# Patient Record
Sex: FEMALE | Race: BLACK OR AFRICAN AMERICAN | NOT HISPANIC OR LATINO | Employment: OTHER | URBAN - METROPOLITAN AREA
[De-identification: names, ages, dates, MRNs, and addresses within clinical notes are randomized per-mention and may not be internally consistent; named-entity substitution may affect disease eponyms.]

---

## 2019-05-21 PROBLEM — R13.10 DYSPHAGIA: Status: ACTIVE | Noted: 2019-03-08

## 2019-08-20 PROBLEM — H25.813 COMBINED FORMS OF AGE-RELATED CATARACT OF BOTH EYES: Status: ACTIVE | Noted: 2019-08-20

## 2019-08-20 PROBLEM — H40.1132 PRIMARY OPEN ANGLE GLAUCOMA (POAG) OF BOTH EYES, MODERATE STAGE: Status: ACTIVE | Noted: 2019-08-20

## 2019-09-26 LAB
INR PPP: 1.1
PROTHROMBIN TIME: 12.4 SEC (ref 9.4–12.5)

## 2020-02-02 PROBLEM — E78.5 HLD (HYPERLIPIDEMIA): Status: ACTIVE | Noted: 2020-02-02

## 2020-02-02 PROBLEM — R07.9 CHEST PAIN: Status: ACTIVE | Noted: 2020-02-02

## 2020-03-12 PROBLEM — F41.9 TREMOR, ANXIETY RELATED: Status: ACTIVE | Noted: 2020-03-12

## 2020-03-12 PROBLEM — R25.1 TREMOR, ANXIETY RELATED: Status: ACTIVE | Noted: 2020-03-12

## 2020-06-15 PROBLEM — I47.10 SVT (SUPRAVENTRICULAR TACHYCARDIA): Status: ACTIVE | Noted: 2019-05-30

## 2020-06-15 PROBLEM — I48.92 ATRIAL FLUTTER, CHRONIC: Status: ACTIVE | Noted: 2019-05-28

## 2020-07-02 PROBLEM — R07.9 CHEST PAIN: Status: RESOLVED | Noted: 2020-02-02 | Resolved: 2020-07-02

## 2020-07-02 PROBLEM — R06.5 XEROSTOMIA DUE TO MOUTH BREATHING: Status: ACTIVE | Noted: 2020-07-02

## 2020-07-02 PROBLEM — Z78.9 INTOLERANCE OF CONTINUOUS POSITIVE AIRWAY PRESSURE (CPAP) VENTILATION: Status: ACTIVE | Noted: 2020-07-02

## 2020-07-02 PROBLEM — K11.7 XEROSTOMIA DUE TO MOUTH BREATHING: Status: ACTIVE | Noted: 2020-07-02

## 2020-09-08 PROBLEM — H25.812 COMBINED FORM OF AGE-RELATED CATARACT, LEFT EYE: Status: ACTIVE | Noted: 2019-08-20

## 2021-03-23 PROBLEM — H04.123 DRY EYE SYNDROME, BILATERAL: Status: ACTIVE | Noted: 2021-03-23

## 2021-04-05 PROBLEM — Z00.00 PREVENTATIVE HEALTH CARE: Status: ACTIVE | Noted: 2021-04-05

## 2021-04-05 PROBLEM — N76.4 LABIAL ABSCESS: Status: ACTIVE | Noted: 2021-04-05

## 2021-05-18 PROBLEM — L73.9 FOLLICULITIS: Status: ACTIVE | Noted: 2021-05-18

## 2021-07-05 PROBLEM — Z00.00 PREVENTATIVE HEALTH CARE: Status: RESOLVED | Noted: 2021-04-05 | Resolved: 2021-07-05

## 2021-09-28 PROBLEM — H40.1132 PRIMARY OPEN ANGLE GLAUCOMA (POAG) OF BOTH EYES, MODERATE STAGE: Status: RESOLVED | Noted: 2019-08-20 | Resolved: 2021-09-28

## 2023-03-05 PROBLEM — R52 PAIN: Status: ACTIVE | Noted: 2023-03-05

## 2023-03-28 PROBLEM — M53.3 SI (SACROILIAC) JOINT DYSFUNCTION: Status: ACTIVE | Noted: 2023-03-28

## 2023-03-28 PROBLEM — M62.830 LUMBAR PARASPINAL MUSCLE SPASM: Status: ACTIVE | Noted: 2023-03-28

## 2023-05-03 ENCOUNTER — PES CALL (OUTPATIENT)
Dept: ADMINISTRATIVE | Facility: CLINIC | Age: 78
End: 2023-05-03
Payer: MEDICAID

## 2023-08-18 PROBLEM — H35.033 HYPERTENSIVE RETINOPATHY OF BOTH EYES, GRADE 1: Status: ACTIVE | Noted: 2023-08-18

## 2023-08-18 PROBLEM — H25.812 COMBINED FORMS OF AGE-RELATED CATARACT OF LEFT EYE: Status: RESOLVED | Noted: 2019-08-20 | Resolved: 2023-08-18

## 2024-01-11 DIAGNOSIS — Z00.00 ENCOUNTER FOR MEDICARE ANNUAL WELLNESS EXAM: ICD-10-CM

## 2024-02-04 PROBLEM — J06.9 UPPER RESPIRATORY TRACT INFECTION: Status: ACTIVE | Noted: 2024-02-04

## 2024-03-20 PROBLEM — I24.9 ACS (ACUTE CORONARY SYNDROME): Status: ACTIVE | Noted: 2024-03-20

## 2024-03-25 ENCOUNTER — PATIENT OUTREACH (OUTPATIENT)
Dept: ADMINISTRATIVE | Facility: CLINIC | Age: 79
End: 2024-03-25

## 2024-03-25 NOTE — PROGRESS NOTES
C3 nurse spoke with Anabel Escobar for a TCC post hospital discharge follow up call. Pt denies any new symptoms at this time and wrote down the OOC number to call for new or worsening symptoms. Several meal delivery numbers provided to the pt and she wrote them down.     The patient has a scheduled followup with Jose Payan MD ([facility]) on 3/28/24 at 0800. Message routed to Timi Chow MD requesting a visit type change to 'HOSFU.'

## 2024-04-13 PROBLEM — I20.0 UNSTABLE ANGINA PECTORIS: Status: ACTIVE | Noted: 2024-04-13

## 2024-04-13 PROBLEM — I25.110 CORONARY ARTERY DISEASE INVOLVING NATIVE CORONARY ARTERY OF NATIVE HEART WITH UNSTABLE ANGINA PECTORIS: Status: ACTIVE | Noted: 2024-04-13

## 2024-04-13 PROBLEM — I47.10 PAROXYSMAL SVT (SUPRAVENTRICULAR TACHYCARDIA): Status: ACTIVE | Noted: 2024-04-13

## 2024-04-26 PROBLEM — R55 NEAR SYNCOPE: Status: ACTIVE | Noted: 2024-04-26

## 2024-04-26 PROBLEM — R00.1 BRADYCARDIA: Status: ACTIVE | Noted: 2024-04-26

## 2024-04-27 PROBLEM — I25.10 CORONARY ARTERY DISEASE: Status: ACTIVE | Noted: 2024-04-13

## 2024-04-29 PROBLEM — I26.99 PULMONARY EMBOLISM WITHOUT ACUTE COR PULMONALE: Status: ACTIVE | Noted: 2024-04-29

## 2024-04-30 PROBLEM — I25.119 CORONARY ARTERY DISEASE INVOLVING NATIVE CORONARY ARTERY OF NATIVE HEART WITH ANGINA PECTORIS: Status: ACTIVE | Noted: 2024-04-13

## 2024-04-30 PROBLEM — I47.10 PAROXYSMAL SVT (SUPRAVENTRICULAR TACHYCARDIA): Status: RESOLVED | Noted: 2024-04-13 | Resolved: 2024-04-30

## 2024-04-30 PROBLEM — I20.0 UNSTABLE ANGINA PECTORIS: Status: RESOLVED | Noted: 2024-04-13 | Resolved: 2024-04-30

## 2024-04-30 PROBLEM — I24.9 ACS (ACUTE CORONARY SYNDROME): Status: RESOLVED | Noted: 2024-03-20 | Resolved: 2024-04-30

## 2024-05-01 ENCOUNTER — PATIENT OUTREACH (OUTPATIENT)
Dept: ADMINISTRATIVE | Facility: CLINIC | Age: 79
End: 2024-05-01

## 2024-05-01 PROBLEM — I25.10 CORONARY ARTERY DISEASE: Status: ACTIVE | Noted: 2024-05-01

## 2024-05-01 NOTE — PROGRESS NOTES
C3 nurse attempted to contact Anabel Escobar for a TCC post hospital discharge follow up call. No answer. No voicemail available. The patient has a scheduled HOSFU appointment with Timi Chow MD on 05/08/24 @ 7139.

## 2024-05-01 NOTE — PROGRESS NOTES
C3 nurse spoke with Anabel Escobar for a TCC post hospital discharge follow up call. The patient has a scheduled HOSFU appointment with Timi Chow MD on 05/08/24 @ 2154.

## 2024-05-04 PROBLEM — I26.99 PULMONARY EMBOLISM WITHOUT ACUTE COR PULMONALE: Chronic | Status: ACTIVE | Noted: 2024-04-29

## 2024-05-08 PROBLEM — D84.821 DRUG-INDUCED IMMUNODEFICIENCY: Status: ACTIVE | Noted: 2024-05-08

## 2024-05-08 PROBLEM — Z79.899 DRUG-INDUCED IMMUNODEFICIENCY: Status: ACTIVE | Noted: 2024-05-08

## 2024-05-08 PROBLEM — J44.9 CHRONIC OBSTRUCTIVE PULMONARY DISEASE, UNSPECIFIED COPD TYPE: Status: ACTIVE | Noted: 2024-05-08

## 2024-05-08 PROBLEM — R42 DIZZINESS: Status: ACTIVE | Noted: 2024-05-08

## 2024-05-08 PROBLEM — D68.69 OTHER THROMBOPHILIA: Status: ACTIVE | Noted: 2024-05-08

## 2024-05-08 PROBLEM — E13.9 OTHER SPECIFIED DIABETES MELLITUS WITHOUT COMPLICATIONS: Status: ACTIVE | Noted: 2024-05-08

## 2024-06-10 PROBLEM — G47.19 EXCESSIVE DAYTIME SLEEPINESS: Status: ACTIVE | Noted: 2024-06-10

## 2024-06-10 PROBLEM — R53.83 FATIGUE: Status: ACTIVE | Noted: 2024-06-10
